# Patient Record
Sex: MALE | Race: WHITE
[De-identification: names, ages, dates, MRNs, and addresses within clinical notes are randomized per-mention and may not be internally consistent; named-entity substitution may affect disease eponyms.]

---

## 2023-09-18 ENCOUNTER — HOSPITAL ENCOUNTER (EMERGENCY)
Dept: HOSPITAL 47 - EC | Age: 28
Discharge: HOME | End: 2023-09-18
Payer: COMMERCIAL

## 2023-09-18 VITALS
HEART RATE: 65 BPM | RESPIRATION RATE: 16 BRPM | SYSTOLIC BLOOD PRESSURE: 148 MMHG | TEMPERATURE: 98.2 F | DIASTOLIC BLOOD PRESSURE: 78 MMHG

## 2023-09-18 DIAGNOSIS — L03.114: Primary | ICD-10-CM

## 2023-09-18 DIAGNOSIS — L03.211: ICD-10-CM

## 2023-09-18 DIAGNOSIS — F17.290: ICD-10-CM

## 2023-09-18 PROCEDURE — 99283 EMERGENCY DEPT VISIT LOW MDM: CPT

## 2023-09-18 NOTE — ED
Skin/Abscess/FB HPI





- General


Source: patient, RN notes reviewed


Mode of arrival: ambulatory


Limitations: no limitations





- History of Present Illness


MD complaint: rash





<Alejandra Clifford - Last Filed: 09/18/23 20:12>





<Serg Barnes - Last Filed: 09/19/23 00:05>





- General


Chief complaint: Skin/Abscess/Foreign Body


Stated complaint: Sores/rash


Time Seen by Provider: 09/18/23 20:10





- History of Present Illness


Initial comments: 


This is a 28 year old male who presents to the emergency department for sores on

his body. Patient has been at [facility] for 4 days for heroin use. States 

that he snorts heroin but does not inject himself. he as a sore to his right arm

and on his face. 


 (Alejandra Clifford)


This 28-year-old male presents with complaints of sores inside his nose and a 

rash to his face and left arm.  This is been present for 3 days.  There is some 

mild pain associated.  There is no fevers or chills.  He states that he does 

feel slightly fatigued.  He denies any history of previous similar incidents.  

He does present from [facility] where he is currently going through 

rehabilitation for heroin abuse.  He apparently does snort heroin normally.  No 

other complaints or modifying factors. (Serg Barnes)





- Related Data


                                  Previous Rx's











 Medication  Instructions  Recorded


 


Mupirocin 2% Oint [Bactroban 2% 1 applic TOPICAL TID #30 gm 09/18/23





Oint]  


 


Sulfamethox-Tmp 800-160Mg [Bactrim 1 tab PO Q12HR #20 tab 09/18/23





-160 mg]  











                                    Allergies











Allergy/AdvReac Type Severity Reaction Status Date / Time


 


No Known Allergies Allergy   Verified 09/18/23 19:44














Review of Systems


ROS Other: All systems not noted in ROS Statement are negative.





<Alejandra Clifford - Last Filed: 09/18/23 20:12>


ROS Other: All systems not noted in ROS Statement are negative.





<Serg Barnes - Last Filed: 09/19/23 00:05>


ROS Statement: 


Those systems with pertinent positive or pertinent negative responses have been 

documented in the HPI.








Past Medical History


Past Medical History: No Reported History


History of Any Multi-Drug Resistant Organisms: None Reported


Past Surgical History: No Surgical Hx Reported


Past Psychological History: ADD/ADHD, Bipolar


Smoking Status: Vaper


Past Drug Use History: Heroin, Opiates





<Alejandra Clifford - Last Filed: 09/18/23 20:12>





General Exam


Limitations: no limitations





<Alejandra Clifford - Last Filed: 09/18/23 20:12>





<Serg Barnes J - Last Filed: 09/19/23 00:05>





- General Exam Comments


Initial Comments: 


Visual Physical Exam





Vital signs reviewed





General: Well-appearing, nontoxic, no acute distress.


Head: Normocephalic, atraumatic


Eyes: PERRLA, EOMI


ENT: Airway patent


Chest: Nonlabored breathing


Skin: No visual rash, normal skin tone


Neuro: Alert and oriented 3


Musculoskeletal: No gross abnormalities





I performed the QuickNote portion of this chart. Signed Alejandra Clifford PA-C.


 (Alejandra Clifford)


GENERAL: The patient is well nourished and well hydrated. 


VITAL SIGNS: Heart rate, blood pressure, respiratory rate reviewed as recorded 

in nurse's notes. 


EYES: Pupils are round and reactive. Extraocular movements are intact. No 

conjunctival / lid redness or swelling. 


ENT: No external evidence of injury, swelling, or ecchymosis. Airway is patent. 

Throat is clear.  There is some irritation and associated erythema in both nares

and was outside of the nose.  There is one sore in the right eyebrow and some in

the upper lip and chin region.


NECK: Nontender. No swelling or evidence of injury. No subcutaneous emphysema. 

Trachea is midline. No thyroid mass. 


HEART: Regular rate and rhythm. Good peripheral pulses. 


LUNGS/CHEST: Breath sounds clear and equal bilaterally. No rales, rhonchi, or 

wheezes. No ecchymosis, subcutaneous emphysema, or tenderness. 


ABDOMEN: Abdomen soft without tenderness. No palpable masses or organomegaly. No

peritoneal signs. No abdominal wall swelling or ecchymosis. 


EXTREMITIES: No extremity tenderness. Normal muscle tone and function. No 

thoracolumbar tenderness. 


NEUROLOGIC: Sensation is grossly intact. Cranial nerve exam reveals face is 

symmetrical, tongue is midline, speech is clear. 


SKIN: No abrasions or ecchymosis is noted. No induration or masses noted.  There

is skin irritation and slight erythema noted in multiple areas to the left arm 

primarily over the forearm region.  Facial rash as noted above.


PSYCHIATRIC: Alert and oriented. Appropriate behavior and judgment.


 (Serg Barnes)





Course


                                   Vital Signs











  09/18/23 09/18/23





  19:41 22:56


 


Temperature 98.6 F 98.2 F


 


Pulse Rate 70 65


 


Respiratory 18 16





Rate  


 


Blood Pressure 134/81 148/78


 


O2 Sat by Pulse 98 98





Oximetry  














Medical Decision Making





<Serg Barnes - Last Filed: 09/19/23 00:05>





- Medical Decision Making





The patient was seen and examined.  It is felt as though his rash likely is a 

MRSA infection.  Bactroban ointment and Bactrim are prescribed.  He is to 

utilize the Bactroban to his nasal region as well as his skin rash.  Close 

follow-up with primary care recommended.  Return parameters are discussed.





Was pt. sent in by a medical professional or institution (, PA, NP, urgent 

care, hospital, or nursing home...) When possible be specific


@  -[No]


Did you speak to anyone other than the patient for history (EMS, parent, family,

 police, friend...)? What history was obtained from this source 


@  -[No]


Did you review nursing and triage notes (agree or disagree)?  Why? 


@  -[I reviewed and agree with nursing and triage notes]


Were old charts reviewed (outside hosp., previous admission, EMS record, old 

EKG, old radiological studies, urgent care reports/EKG's, nursing home records)?

Report findings 


@  -[No old charts were reviewed]


Differential Diagnosis (chest pain, altered mental status, abdominal pain women,

abdominal pain men, vaginal bleeding, weakness, fever, dyspnea, syncope, 

headache, dizziness, GI bleed, back pain, seizure, CVA, palpatations, mental 

health, musculoskeletal)? 


@  -[not applicable]


EKG interpreted by me (3pts min.).


@  -None


X-rays interpreted by me (1pt min.).


@  -[None done]


CT interpreted by me (1pt min.).


@  -[None done]


U/S interpreted by me (1pt. min.).


@  -[None done]


What testing was considered but not performed or refused? (CT, X-rays, U/S, 

labs)? Why?


@  -[None]


What meds were considered but not given or refused? Why?


@  -[None]


Did you discuss the management of the patient with other professionals (pro

fessionals i.e. , PA, NP, lab, RT, psych nurse, , , 

teacher, , )? Give summary


@  -[No]


Was smoking cessation discussed for >3mins.?


@  -[No]


Was critical care preformed (if so, how long)?


@  -[No]


Were there social determinants of health that impacted care today? How? 

(Homelessness, low income, unemployed, alcoholism, drug addiction, 

transportation, low edu. Level, literacy, decrease access to med. care, [facility], 

rehab)?


@  -[No]


Was there de-escalation of care discussed even if they declined (Discuss DNR or 

withdrawal of care, Hospice)? DNR status


@  -[No]


What co-morbidities impacted this encounter? (DM, HTN, Smoking, COPD, CAD, 

Cancer, CVA, ARF, Chemo, Hep., AIDS, mental health diagnosis, sleep apnea, 

morbid obesity)?


@  -Heroin abuse


Was patient admitted / discharged? Hospital course, mention meds given and 

route, prescriptions, significant lab abnormalities, going to OR and other 

pertinent info.


@  -Discharged back to [facility] for rehabilitation.


Undiagnosed new problem with uncertain prognosis?


@  -[No]


Drug Therapy requiring intensive monitoring for toxicity (Heparin, Nitro, 

Insulin, Cardizem)?


@  -[No]


Were any procedures done?


@  -[No]


Diagnosis/symptom?


@  -Skin infection, cellulitis, likely MRSA infection.


Acute, or Chronic, or Acute on Chronic?


@  -Acute


Uncomplicated (without systemic symptoms) or Complicated (systemic symptoms)?


@  -Uncomplicated


Side effects of treatment?


@  -[No]


Exacerbation, Progression, or Severe Exacerbation?


@  -[No]


Poses a threat to life or bodily function? How? (Chest pain, USA, MI, pneumonia,

 PE, COPD, DKA, ARF, appy, cholecystitis, CVA, Diverticulitis, Homicidal, 

Suicidal, threat to staff... and all critical care pts)


@  -[No] (Serg Barnes)





Disposition





<Alejandra Clifford - Last Filed: 09/18/23 20:12>


Is patient prescribed a controlled substance at d/c from ED?: No


Time of Disposition: 22:07





<Serg Barnes - Last Filed: 09/19/23 00:05>


Clinical Impression: 


 Rash, Cellulitis





Disposition: HOME SELF-CARE


Condition: Good


Instructions (If sedation given, give patient instructions):  Cellulitis (ED)


Prescriptions: 


Sulfamethox-Tmp 800-160Mg [Bactrim -160 mg] 1 tab PO Q12HR #20 tab


Mupirocin 2% Oint [Bactroban 2% Oint] 1 applic TOPICAL TID #30 gm


Referrals: 


None,Stated [Primary Care Provider] - 1-2 days